# Patient Record
Sex: FEMALE | Race: AMERICAN INDIAN OR ALASKA NATIVE | URBAN - METROPOLITAN AREA
[De-identification: names, ages, dates, MRNs, and addresses within clinical notes are randomized per-mention and may not be internally consistent; named-entity substitution may affect disease eponyms.]

---

## 2023-10-02 ENCOUNTER — LAB OUTSIDE AN ENCOUNTER (OUTPATIENT)
Dept: URBAN - METROPOLITAN AREA CLINIC 92 | Facility: CLINIC | Age: 58
End: 2023-10-02

## 2023-10-02 ENCOUNTER — OFFICE VISIT (OUTPATIENT)
Dept: URBAN - METROPOLITAN AREA CLINIC 92 | Facility: CLINIC | Age: 58
End: 2023-10-02

## 2023-10-02 ENCOUNTER — DASHBOARD ENCOUNTERS (OUTPATIENT)
Age: 58
End: 2023-10-02

## 2023-10-02 PROBLEM — 235595009: Status: ACTIVE | Noted: 2023-10-02

## 2023-10-02 PROBLEM — 428283002: Status: ACTIVE | Noted: 2023-10-02

## 2023-10-02 RX ORDER — ALBUTEROL SULFATE 108 UG/1
INHALE 1 PUFF (90 MCG) BY INHALATION ROUTE EVERY 6 HOURS AS NEEDED AEROSOL, METERED RESPIRATORY (INHALATION)
Qty: 1 | Refills: 0 | COMMUNITY
Start: 1900-01-01

## 2023-10-02 RX ORDER — IBUPROFEN 800 MG/1
TAKE 1 TABLET BY ORAL ROUTE AS NEEDED TABLET ORAL
Qty: 0 | Refills: 0 | COMMUNITY
Start: 1900-01-01

## 2023-10-02 RX ORDER — LISINOPRIL AND HYDROCHLOROTHIAZIDE TABLETS 20; 12.5 MG/1; MG/1
TAKE 1 TABLET BY ORAL ROUTE ONCE DAILY TABLET ORAL 1
Qty: 0 | Refills: 0 | COMMUNITY
Start: 1900-01-01

## 2023-10-02 RX ORDER — TEMAZEPAM 7.5 MG/1
TAKE 1 CAPSULE (7.5 MG) BY ORAL ROUTE ONCE DAILY AT BEDTIME AS NEEDED CAPSULE ORAL 1
Qty: 0 | Refills: 0 | COMMUNITY
Start: 1900-01-01

## 2023-10-02 RX ORDER — SOFOSBUVIR 400 MG/1
TAKE 1 TABLET (400 MG) BY ORAL ROUTE ONCE DAILY TABLET, FILM COATED ORAL 1
Qty: 0 | Refills: 0 | COMMUNITY
Start: 1900-01-01

## 2023-10-02 NOTE — HPI-TODAY'S VISIT:
This is a 58-year-old female referred for GI consultation and a copy will be sent to the referring provider.  Patient saw Dr. Dockery back in May 2016.  She had a history of hep C, dysphagia and needed a colonoscopy.  She was on treatment for hep C at that time.  Dr. Dockery did an EGD and colonoscopy in Rachel 15, 2016.  Colonoscopy showed internal hemorrhoids and a 3 mm sigmoid colon polyp and 3 to 4 mm ascending colon polyps.  They were removed.  EGD done showed a stricture that was dilated with 18 mm balloon dilator and a small hiatal hernia.  Pathology showed chronic gastritis of the antrum but no H. pylori.  Colonoscopy showed a tubular adenoma.